# Patient Record
Sex: MALE | Race: WHITE | Employment: FULL TIME | URBAN - METROPOLITAN AREA
[De-identification: names, ages, dates, MRNs, and addresses within clinical notes are randomized per-mention and may not be internally consistent; named-entity substitution may affect disease eponyms.]

---

## 2017-02-16 PROBLEM — M85.80 OSTEOPENIA: Status: ACTIVE | Noted: 2017-02-16

## 2017-02-17 PROBLEM — S93.324A DISLOCATION OF TARSOMETATARSAL JOINT OF RIGHT FOOT: Status: ACTIVE | Noted: 2017-02-17

## 2017-10-03 PROBLEM — T84.9XXA COMPLICATION INVOLVING ORTHOPEDIC INTERNAL FIXATION DEVICE (HCC): Status: ACTIVE | Noted: 2017-10-03

## 2018-02-22 PROBLEM — Z98.890 S/P HARDWARE REMOVAL: Status: ACTIVE | Noted: 2018-02-22

## 2018-03-12 ENCOUNTER — HOSPITAL ENCOUNTER (OUTPATIENT)
Dept: PHYSICAL THERAPY | Age: 38
Setting detail: THERAPIES SERIES
Discharge: HOME | End: 2018-03-12
Payer: COMMERCIAL

## 2018-03-12 PROCEDURE — W0710 HC WORK COND PROG PER 15 MIN: HCPCS | Performed by: PHYSICAL THERAPIST

## 2018-03-12 NOTE — PROGRESS NOTES
[facility]                Phone: [phone]   Fax: [phone]    Physical Therapy Daily Treatment Note  Date:  3/12/2018    Patient Name:  Jalen Canas    :  1980  MRN: [id number]       Evaluating Physical Therapist:  KIRSTY Oseguera                (3/5/18)  Rerring Physician: Dylan Persaud   Diagnosis:    R ankle/ foot pain, gait dysfunction     (ORIF R foot)  Date initial Injury:  fall 10/6/16  Precautions: WBAT BLE  Insurance Information:  [employer]    Plan of care signed (Y/N):    Visit# / total visits:   3/24   Pain:    3/10     Time In:   933       Time Out:  1025           Subjective:     . Exercises:  Exercise/Equipment Resistance/Repetitions Other comments   Recumbent bike 10 min          leg press [id number]         toe raises 2x15    step ups  2x10x6\"            sides  2x10x6\"         stairwell 10x5         floor-waist lift 3 min at 20lb    waist-shoulder lift 3 min  at 20lb               standing work 5 min     push-pull cart 5 min at 50lb          ladder climbs           x2                 Other Therapeutic Activities:       Modalities:        Comments:        Home Exercise Program:      Comments:     Treatment/Activity Tolerance:  [] Patient tolerated treatment well [] Patient limited by fatigue  [] Patient limited by pain  [] Patient limited by other medical complications  [] Other:     Prognosis: [] Good [] Fair  [] Poor    Patient Requires Follow-up: [] Yes  [] No    Plan:   [] Continue per plan of care [] Alter current plan (see comments)  [] Plan of care initiated [] Hold pending MD visit [] Discharge    Plan for Next Session:        See Weekly Progress Note: []  Yes  []  No  Next due:            Electronically signed by:        Ovi Unger

## 2018-03-14 ENCOUNTER — HOSPITAL ENCOUNTER (OUTPATIENT)
Dept: PHYSICAL THERAPY | Age: 38
Setting detail: THERAPIES SERIES
Discharge: HOME | End: 2018-03-14
Payer: COMMERCIAL

## 2018-03-14 PROCEDURE — W0710 HC WORK COND PROG PER 15 MIN: HCPCS | Performed by: PHYSICAL THERAPIST

## 2018-03-16 ENCOUNTER — HOSPITAL ENCOUNTER (OUTPATIENT)
Dept: PHYSICAL THERAPY | Age: 38
Setting detail: THERAPIES SERIES
Discharge: HOME | End: 2018-03-16
Payer: COMMERCIAL

## 2018-03-16 PROCEDURE — W0710 HC WORK COND PROG PER 15 MIN: HCPCS | Performed by: PHYSICAL THERAPIST

## 2018-03-16 NOTE — PROGRESS NOTES
S:  pt presents to therapy for three of three scheduled visits this week for work [address]; at [facility] [employer] end he reports that his ankle seemed to ache with most of the activities he was given; no c/o buckling or LOB over last week's time; felt he needed extended rest at times due to fatigue and aching; pain level given as 3/10 on avg    O:  performed the exercises/tasks as written in the flowsheet for work reconditioning for the week ending 3/16/18; pt able to perform all functional tasks as written below at this time:    bike:  10 min   floor-waist lift:  3 min x 20lb  waist-shoulder lift:  3 min x 20lb  push-pull cart:  5 min x 50lb  standing work:  5 min     pt able to complete given circuit twice through in 2 hrs    A:  michael tx well; pt able to perform all requested tasks with good form and some extended rest periods; no c/o or signs of instability during or after session; static/dynamic balance was GOOD; endurance for all prolonged activities GOOD     P:  cont with POC of work reconditioning as set; look to increase both load and time by end of week.

## 2018-03-19 ENCOUNTER — HOSPITAL ENCOUNTER (OUTPATIENT)
Dept: PHYSICAL THERAPY | Age: 38
Setting detail: THERAPIES SERIES
Discharge: HOME | End: 2018-03-19
Payer: COMMERCIAL

## 2018-03-19 PROCEDURE — W0710 HC WORK COND PROG PER 15 MIN: HCPCS | Performed by: PHYSICAL THERAPIST

## 2018-03-19 NOTE — PROGRESS NOTES
[address] [facility]                Phone: [phone]   Fax: [phone]    Physical Therapy Daily Treatment Note  Date:  3/19/2018    Patient Name:  Abrahan Lam    :  1980  MRN: [id number]       Evaluating Physical Therapist:  KIRSTY Linares                (3/5/18)  Rerring Physician: Kiley Sam   Diagnosis:    R ankle/ foot pain, gait dysfunction     (ORIF R foot)  Date initial Injury:  fall 10/6/16  Precautions: WBAT BLE  Insurance Information:  [employer]    Plan of care signed (Y/N):    Visit# / total visits:      Pain:    3/10     Time In:   701       Time Out:  1026      Subjective:     . Exercises:  Exercise/Equipment Resistance/Repetitions Other comments   Recumbent bike 10 min          leg press [id number]         toe raises 2x15    step ups  2x10x6\"            sides  2x10x6\"         stairwell 10x5         floor-waist lift 3 min at 20lb    waist-shoulder lift 3 min  at 20lb               standing work 5 min     push-pull cart 5 min at 50lb          ladder climbs           x2                 Other Therapeutic Activities:       Modalities:        Comments:        Home Exercise Program:      Comments:     Treatment/Activity Tolerance:  [] Patient tolerated treatment well [] Patient limited by fatigue  [] Patient limited by pain  [] Patient limited by other medical complications  [] Other:     Prognosis: [] Good [] Fair  [] Poor    Patient Requires Follow-up: [] Yes  [] No    Plan:   [] Continue per plan of care [] Alter current plan (see comments)  [] Plan of care initiated [] Hold pending MD visit [] Discharge    Plan for Next Session:        See Weekly Progress Note: []  Yes  []  No  Next due:            Electronically signed by:        Triny Cannon

## 2018-03-21 ENCOUNTER — HOSPITAL ENCOUNTER (OUTPATIENT)
Dept: PHYSICAL THERAPY | Age: 38
Setting detail: THERAPIES SERIES
Discharge: HOME | End: 2018-03-21
Payer: COMMERCIAL

## 2018-03-21 NOTE — PROGRESS NOTES
[address] [facility]                Phone: [phone]  Fax: [phone]    Physical Therapy  Cancellation/No-show Note  Patient Name:  Maverick Platt  :  1980   Date:  3/21/2018    For today's appointment patient:  [x]  Cancelled  []  Rescheduled appointment  []  No-show     Reason given by patient:  []  Patient ill  []  Conflicting appointment  []  No transportation    []  Conflict with work  []  No reason given  [x]  Other:  son home from school. Comments:      Electronically signed by:   Kye Syed

## 2018-03-23 ENCOUNTER — HOSPITAL ENCOUNTER (OUTPATIENT)
Dept: PHYSICAL THERAPY | Age: 38
Setting detail: THERAPIES SERIES
Discharge: HOME | End: 2018-03-23
Payer: COMMERCIAL

## 2018-03-23 PROCEDURE — W0710 HC WORK COND PROG PER 15 MIN: HCPCS | Performed by: PHYSICAL THERAPIST

## 2018-03-23 NOTE — PROGRESS NOTES
[address] [facility]                Phone: [phone]   Fax: [phone]    Physical Therapy Daily Treatment Note  Date:  3/23/2018    Patient Name:  Erik Dukes    :  1980  MRN: [id number]       Evaluating Physical Therapist:  KIRSTY Gavin                (3/5/18)  Rerring Physician: [place] De Souza   Diagnosis:    R ankle/ foot pain, gait dysfunction     (ORIF R foot)  Date initial Injury:  fall 10/6/16  Precautions: WBAT BLE  Insurance Information:  [employer]    Plan of care signed (Y/N):    Visit# / total visits:      Pain:    3/10     Time In:   819       Time Out:  1018      Subjective:     . Exercises:  Exercise/Equipment Resistance/Repetitions Other comments   Recumbent bike 10 min          leg press [id number]         toe raises 2x15    step ups  2x10x6\"            sides  2x10x6\"         stairwell 10x5         floor-waist lift 3 min at 20lb    waist-shoulder lift 3 min  at 20lb               standing work 5 min     push-pull cart 5 min at 50lb          ladder climbs           x2                 Other Therapeutic Activities:       Modalities:        Comments:        Home Exercise Program:      Comments:     Treatment/Activity Tolerance:  [] Patient tolerated treatment well [] Patient limited by fatigue  [] Patient limited by pain  [] Patient limited by other medical complications  [] Other:     Prognosis: [] Good [] Fair  [] Poor    Patient Requires Follow-up: [] Yes  [] No    Plan:   [] Continue per plan of care [] Alter current plan (see comments)  [] Plan of care initiated [] Hold pending MD visit [] Discharge    Plan for Next Session:        See Weekly Progress Note: []  Yes  []  No  Next due:            Electronically signed by:        Geni Smith

## 2018-03-26 ENCOUNTER — HOSPITAL ENCOUNTER (OUTPATIENT)
Dept: PHYSICAL THERAPY | Age: 38
Setting detail: THERAPIES SERIES
Discharge: HOME | End: 2018-03-26
Payer: COMMERCIAL

## 2018-03-26 PROCEDURE — W0710 HC WORK COND PROG PER 15 MIN: HCPCS | Performed by: PHYSICAL THERAPIST

## 2018-03-26 NOTE — PROGRESS NOTES
[address] [facility]                Phone: [phone]   Fax: [phone]    Physical Therapy Daily Treatment Note  Date:  3/26/2018    Patient Name:  Javier Ramos    :  1980  MRN: [id number]       Evaluating Physical Therapist:  KIRSTY Saravia                (3/5/18)  Rerring Physician: Alfreda Adler   Diagnosis:    R ankle/ foot pain, gait dysfunction     (ORIF R foot)  Date initial Injury:  fall 10/6/16  Precautions: WBAT BLE  Insurance Information:  [employer]    Plan of care signed (Y/N):    Visit# / total visits:      Pain:    3/10     Time In:   439       Time Out:  1102      Subjective:     . Exercises:  Exercise/Equipment Resistance/Repetitions Other comments   Recumbent bike 10 min          leg press [id number]         toe raises 2x15    step ups  2x10x6\"            sides  2x10x6\"         stairwell 10x5         floor-waist lift 4 min at 20lb    waist-shoulder lift 4 min  at 20lb               standing work 5 min     push-pull cart 5 min at 50lb          ladder climbs           x2                 Other Therapeutic Activities:       Modalities:        Comments:        Home Exercise Program:      Comments:     Treatment/Activity Tolerance:  [] Patient tolerated treatment well [] Patient limited by fatigue  [] Patient limited by pain  [] Patient limited by other medical complications  [] Other:     Prognosis: [] Good [] Fair  [] Poor    Patient Requires Follow-up: [] Yes  [] No    Plan:   [] Continue per plan of care [] Alter current plan (see comments)  [] Plan of care initiated [] Hold pending MD visit [] Discharge    Plan for Next Session:        See Weekly Progress Note: []  Yes  []  No  Next due:            Electronically signed by:        Rylee Duron

## 2018-03-28 ENCOUNTER — HOSPITAL ENCOUNTER (OUTPATIENT)
Dept: PHYSICAL THERAPY | Age: 38
Setting detail: THERAPIES SERIES
Discharge: HOME | End: 2018-03-28
Payer: COMMERCIAL

## 2018-03-28 PROCEDURE — W0710 HC WORK COND PROG PER 15 MIN: HCPCS

## 2018-03-28 NOTE — PROGRESS NOTES
for Next Session:        See Weekly Progress Note: []  Yes  []  No  Next due:            Electronically signed by:     Tima Talbot DPT  [id number]

## 2018-04-04 ENCOUNTER — APPOINTMENT (OUTPATIENT)
Dept: PHYSICAL THERAPY | Age: 38
End: 2018-04-04
Payer: COMMERCIAL

## 2018-04-06 ENCOUNTER — HOSPITAL ENCOUNTER (OUTPATIENT)
Dept: PHYSICAL THERAPY | Age: 38
Setting detail: THERAPIES SERIES
Discharge: HOME | End: 2018-04-06
Payer: COMMERCIAL

## 2018-04-06 PROCEDURE — 97799 UNLISTED PHYSCL MED/REHAB PX: CPT

## 2018-04-09 ENCOUNTER — HOSPITAL ENCOUNTER (OUTPATIENT)
Dept: PHYSICAL THERAPY | Age: 38
Setting detail: THERAPIES SERIES
Discharge: HOME | End: 2018-04-09
Payer: COMMERCIAL

## 2018-04-09 PROCEDURE — W0710 HC WORK COND PROG PER 15 MIN: HCPCS | Performed by: PHYSICAL THERAPIST

## 2018-04-10 ENCOUNTER — TELEPHONE (OUTPATIENT)
Dept: ORTHOPEDIC SURGERY | Age: 38
End: 2018-04-10

## 2018-04-11 ENCOUNTER — HOSPITAL ENCOUNTER (OUTPATIENT)
Dept: PHYSICAL THERAPY | Age: 38
Setting detail: THERAPIES SERIES
Discharge: HOME | End: 2018-04-11
Payer: COMMERCIAL

## 2018-04-11 PROCEDURE — W0710 HC WORK COND PROG PER 15 MIN: HCPCS | Performed by: PHYSICAL THERAPIST

## 2018-04-13 ENCOUNTER — HOSPITAL ENCOUNTER (OUTPATIENT)
Dept: PHYSICAL THERAPY | Age: 38
Setting detail: THERAPIES SERIES
Discharge: HOME | End: 2018-04-13
Payer: COMMERCIAL

## 2018-04-13 PROCEDURE — W0710 HC WORK COND PROG PER 15 MIN: HCPCS | Performed by: PHYSICAL THERAPIST

## 2018-04-17 ENCOUNTER — HOSPITAL ENCOUNTER (OUTPATIENT)
Dept: PHYSICAL THERAPY | Age: 38
Setting detail: THERAPIES SERIES
Discharge: HOME | End: 2018-04-17
Payer: COMMERCIAL

## 2018-04-17 PROCEDURE — W0710 HC WORK COND PROG PER 15 MIN: HCPCS | Performed by: PHYSICAL THERAPIST

## 2018-04-18 ENCOUNTER — HOSPITAL ENCOUNTER (OUTPATIENT)
Dept: PHYSICAL THERAPY | Age: 38
Setting detail: THERAPIES SERIES
Discharge: HOME | End: 2018-04-18
Payer: COMMERCIAL

## 2018-04-18 PROCEDURE — W0710 HC WORK COND PROG PER 15 MIN: HCPCS | Performed by: PHYSICAL THERAPIST

## 2018-04-20 ENCOUNTER — HOSPITAL ENCOUNTER (OUTPATIENT)
Dept: PHYSICAL THERAPY | Age: 38
Setting detail: THERAPIES SERIES
Discharge: HOME | End: 2018-04-20
Payer: COMMERCIAL

## 2018-04-20 PROCEDURE — W0710 HC WORK COND PROG PER 15 MIN: HCPCS

## 2018-04-23 ENCOUNTER — HOSPITAL ENCOUNTER (OUTPATIENT)
Dept: PHYSICAL THERAPY | Age: 38
Setting detail: THERAPIES SERIES
Discharge: HOME | End: 2018-04-23
Payer: COMMERCIAL

## 2018-04-23 PROCEDURE — W0710 HC WORK COND PROG PER 15 MIN: HCPCS | Performed by: PHYSICAL THERAPIST

## 2018-04-25 ENCOUNTER — HOSPITAL ENCOUNTER (OUTPATIENT)
Dept: PHYSICAL THERAPY | Age: 38
Setting detail: THERAPIES SERIES
Discharge: HOME | End: 2018-04-25
Payer: COMMERCIAL

## 2018-04-25 PROCEDURE — W0710 HC WORK COND PROG PER 15 MIN: HCPCS

## 2018-05-08 ENCOUNTER — OFFICE VISIT (OUTPATIENT)
Dept: ORTHOPEDIC SURGERY | Age: 38
End: 2018-05-08
Payer: COMMERCIAL

## 2018-05-08 VITALS — BODY MASS INDEX: 22.96 KG/M2 | HEART RATE: 84 BPM | WEIGHT: 155 LBS | HEIGHT: 69 IN | OXYGEN SATURATION: 98 %

## 2018-05-08 DIAGNOSIS — T84.196D: ICD-10-CM

## 2018-05-08 DIAGNOSIS — S93.325D DISLOCATION OF TARSOMETATARSAL JOINT OF LEFT FOOT, SUBSEQUENT ENCOUNTER: Primary | ICD-10-CM

## 2018-05-08 DIAGNOSIS — S92.301D CLOSED DISPLACED FRACTURE OF METATARSAL BONE OF RIGHT FOOT WITH ROUTINE HEALING, UNSPECIFIED METATARSAL, SUBSEQUENT ENCOUNTER: ICD-10-CM

## 2018-05-08 DIAGNOSIS — S92.201D: ICD-10-CM

## 2018-05-08 DIAGNOSIS — S93.324D DISLOCATION OF TARSOMETATARSAL JOINT OF RIGHT FOOT, SUBSEQUENT ENCOUNTER: ICD-10-CM

## 2018-05-08 DIAGNOSIS — Z98.890 S/P HARDWARE REMOVAL: ICD-10-CM

## 2018-05-08 PROCEDURE — 99213 OFFICE O/P EST LOW 20 MIN: CPT | Performed by: ORTHOPAEDIC SURGERY

## 2018-05-08 PROCEDURE — 99212 OFFICE O/P EST SF 10 MIN: CPT

## 2018-05-16 ENCOUNTER — HOSPITAL ENCOUNTER (OUTPATIENT)
Dept: PHYSICAL THERAPY | Age: 38
Setting detail: THERAPIES SERIES
Discharge: HOME | End: 2018-05-16
Payer: COMMERCIAL

## 2018-05-16 NOTE — PROGRESS NOTES
[address] [facility]                Phone: [phone]   Fax: [phone]    To:  Dr. Lauren Barnhart       From: [place]      Patient: Edison Ramey       : 1980  Diagnosis:       Date: 2018  Treatment Diagnosis: R ankle/foot pain, gait dysfunction      Physical Therapy Discharge Note    Total Visits to date:   23 Cancels/No-shows to date:  5 cancellations/1 no-show     Plan of Care/Treatment to date:  [] Therapeutic Exercise    [] Modalities:  [] Therapeutic Activity     [] Ultrasound  [] Electrical Stimulation  [] Gait Training      [] Cervical Traction   [] Lumbar Traction  [] Neuromuscular Re-education  [] Cold/hotpack [] Iontophoresis  [] Instruction in HEP      Other:  [] Manual Therapy       [x]  work reconditioning  [] Aquatic Therapy       []                    ? Progress towards goals:  pt reached all stated functional LTG's for therapy and was I with HEP for home management of condition; FCE performed 18. Goal Status:  [x] Achieved [] Partially Achieved  [] Not Achieved     Patient Status: [] Continue per initial plan of Care     [x] Patient now discharged     [] Additional visits requested, Please re-certify for additional visits:          Electronically signed by: KIRSTY Byrne     If you have any questions or concerns, please don't hesitate to call.   Thank you for your referral.

## 2018-09-20 ENCOUNTER — TELEPHONE (OUTPATIENT)
Dept: ORTHOPEDIC SURGERY | Age: 38
End: 2018-09-20

## 2018-09-20 NOTE — TELEPHONE ENCOUNTER
Received vm from pt--ret his call--he stated he is having issues with his foot --he has had increase in swelling and pain and is concerned that something is wrong post surgery--he will call me back with his work schedule tomorrow so I can set him up to be seen

## 2018-09-28 ENCOUNTER — HOSPITAL ENCOUNTER (EMERGENCY)
Age: 38
Discharge: HOME | End: 2018-09-28

## 2018-09-28 ENCOUNTER — APPOINTMENT (OUTPATIENT)
Dept: GENERAL RADIOLOGY | Age: 38
End: 2018-09-28

## 2018-09-28 VITALS
BODY MASS INDEX: 22.96 KG/M2 | HEIGHT: 69 IN | SYSTOLIC BLOOD PRESSURE: 107 MMHG | RESPIRATION RATE: 14 BRPM | DIASTOLIC BLOOD PRESSURE: 65 MMHG | OXYGEN SATURATION: 97 % | HEART RATE: 88 BPM | TEMPERATURE: 99.6 F | WEIGHT: 155 LBS

## 2018-09-28 DIAGNOSIS — J06.9 UPPER RESPIRATORY TRACT INFECTION, UNSPECIFIED TYPE: Primary | ICD-10-CM

## 2018-09-28 DIAGNOSIS — J02.9 ACUTE PHARYNGITIS, UNSPECIFIED ETIOLOGY: ICD-10-CM

## 2018-09-28 DIAGNOSIS — R05.9 COUGH: ICD-10-CM

## 2018-09-28 LAB
INFLUENZA A BY PCR: NOT DETECTED
INFLUENZA B BY PCR: NOT DETECTED
STREP GRP A PCR: NEGATIVE

## 2018-09-28 PROCEDURE — 87502 INFLUENZA DNA AMP PROBE: CPT

## 2018-09-28 PROCEDURE — 87880 STREP A ASSAY W/OPTIC: CPT

## 2018-09-28 PROCEDURE — 71046 X-RAY EXAM CHEST 2 VIEWS: CPT

## 2018-09-28 PROCEDURE — 99283 EMERGENCY DEPT VISIT LOW MDM: CPT

## 2018-09-28 PROCEDURE — 6370000000 HC RX 637 (ALT 250 FOR IP): Performed by: NURSE PRACTITIONER

## 2018-09-28 RX ORDER — GUAIFENESIN 100 MG/5ML
200 SOLUTION ORAL ONCE
Status: COMPLETED | OUTPATIENT
Start: 2018-09-28 | End: 2018-09-28

## 2018-09-28 RX ORDER — BENZONATATE 100 MG/1
100 CAPSULE ORAL 3 TIMES DAILY PRN
Qty: 21 CAPSULE | Refills: 0 | Status: SHIPPED | OUTPATIENT
Start: 2018-09-28 | End: 2018-10-05

## 2018-09-28 RX ORDER — IBUPROFEN 800 MG/1
800 TABLET ORAL ONCE
Status: COMPLETED | OUTPATIENT
Start: 2018-09-28 | End: 2018-09-28

## 2018-09-28 RX ORDER — AMOXICILLIN 500 MG/1
500 CAPSULE ORAL 2 TIMES DAILY
Qty: 20 CAPSULE | Refills: 0 | Status: SHIPPED | OUTPATIENT
Start: 2018-09-28 | End: 2018-10-08

## 2018-09-28 RX ORDER — GUAIFENESIN 1200 MG/1
1 TABLET, EXTENDED RELEASE ORAL 2 TIMES DAILY PRN
Qty: 14 TABLET | Refills: 0 | Status: SHIPPED | OUTPATIENT
Start: 2018-09-28 | End: 2018-10-05

## 2018-09-28 RX ADMIN — GUAIFENESIN 200 MG: 200 SOLUTION ORAL at 17:47

## 2018-09-28 RX ADMIN — IBUPROFEN 800 MG: 800 TABLET, FILM COATED ORAL at 17:46

## 2018-09-28 ASSESSMENT — PAIN DESCRIPTION - PAIN TYPE: TYPE: ACUTE PAIN

## 2018-09-28 ASSESSMENT — PAIN SCALES - GENERAL
PAINLEVEL_OUTOF10: 5
PAINLEVEL_OUTOF10: 5

## 2018-09-28 ASSESSMENT — PAIN DESCRIPTION - LOCATION: LOCATION: GENERALIZED

## 2018-09-28 NOTE — ED PROVIDER NOTES
[facility]     Department of Emergency Medicine   ED  Provider Note  Admit Date/RoomTime: 9/28/2018  4:52 PM  ED Room: [facility]  Chief Complaint:   URI (symptoms started this morning); Cough (productive; clear phlegm); Pharyngitis; and Headache    History of Present Illness   Source of history provided by:  patient. History/Exam Limitations: none. Eudora Fothergill is a 43 y.o. old male with a past medical history of: History reviewed. No pertinent past medical history. presents to the emergency department by private vehicle, for fever, chills, nasal congestion, rhinorrhea, sore throat, facial pain and cough, which began 2 day(s) prior to arrival.  Since onset the symptoms have been persistent and moderate in severity. The symptoms are associated with nausea. There has been NO abdominal pain, appetite decrease, chest pain, diarrhea, dizziness, dysuria, vomiting or wheezing. He states that he is coughing up clear to white phlegm. Also states that he has been having intermittent headaches for the last couple days. ROS   Pertinent positives and negatives are stated within HPI, all other systems reviewed and are negative. Past Surgical History:  has a past surgical history that includes Ankle surgery (Right, 10/13/2016); other surgical history (Right, 11/17/2016); and Foot surgery (11/29/2017). Social History:  reports that he has been smoking Cigarettes. He has a 25.00 pack-year smoking history. He has never used smokeless tobacco. He reports that he drinks alcohol. He reports that he does not use drugs. Family History: family history is not on file. Allergies: Patient has no known allergies. Physical Exam           ED Triage Vitals [09/28/18 1649]   BP Temp Temp Source Pulse Resp SpO2 Height Weight   (!) 150/70 99.6 °F (37.6 °C) Oral 106 16 100 % 5' 9\" (1.753 m) 155 lb (70.3 kg)      Oxygen Saturation Interpretation: Normal.    · Constitutional:  Alert, development consistent with age.   · Ears: findings. Upper respiratory infection is likely to  be viral in etiology . Antibiotics are indicated at this time based on clinical presentation and physical findings. He is not hypoxic. Patient is well appearing, non toxic and appropriate for outpatient management. Plan of Care: Normal progression of disease discussed. All questions answered. Explained the rationale for symptomatic treatment rather than use of an antibiotic. Instruction provided in the use of fluids, vaporizer, acetaminophen, and other OTC medication for symptom control. Extra fluids  Analgesics as needed, dose reviewed. Follow up as needed should symptoms fail to improve. Counseling: The emergency provider has spoken with the patient and discussed todays results, in addition to providing specific details for the plan of care and counseling regarding the diagnosis and prognosis. Questions are answered at this time and they are agreeable with the plan. Assessment     1. Upper respiratory tract infection, unspecified type    2. Cough    3. Acute pharyngitis, unspecified etiology      Plan   Discharge to home  Patient condition is good    New Medications     New Prescriptions    AMOXICILLIN (AMOXIL) 500 MG CAPSULE    Take 1 capsule by mouth 2 times daily for 10 days    BENZONATATE (TESSALON PERLES) 100 MG CAPSULE    Take 1 capsule by mouth 3 times daily as needed for Cough    GUAIFENESIN (MUCINEX MAXIMUM STRENGTH) 1200 MG TB12    Take 1 tablet by mouth 2 times daily as needed (Congestion)     Electronically signed by HUMERA Lopez CNP   DD: 9/28/18  **This report was transcribed using voice recognition software. Every effort was made to ensure accuracy; however, inadvertent computerized transcription errors may be present.   END OF ED PROVIDER NOTE          HUMERA Iglesias CNP  09/28/18 1657

## 2018-12-19 ENCOUNTER — TELEPHONE (OUTPATIENT)
Dept: ADMINISTRATIVE | Age: 38
End: 2018-12-19

## 2018-12-19 NOTE — TELEPHONE ENCOUNTER
Pt wanted to schedule with Dr Bipin Laura in January. There were no openings. He was transferred to [facility].

## 2019-01-07 DIAGNOSIS — M79.671 RIGHT FOOT PAIN: Primary | ICD-10-CM

## 2019-01-10 ENCOUNTER — HOSPITAL ENCOUNTER (OUTPATIENT)
Dept: GENERAL RADIOLOGY | Age: 39
Discharge: HOME | End: 2019-01-12
Payer: COMMERCIAL

## 2019-01-10 ENCOUNTER — OFFICE VISIT (OUTPATIENT)
Dept: ORTHOPEDIC SURGERY | Age: 39
End: 2019-01-10
Payer: COMMERCIAL

## 2019-01-10 VITALS
SYSTOLIC BLOOD PRESSURE: 101 MMHG | BODY MASS INDEX: 22.96 KG/M2 | WEIGHT: 155 LBS | HEIGHT: 69 IN | DIASTOLIC BLOOD PRESSURE: 68 MMHG | RESPIRATION RATE: 16 BRPM | HEART RATE: 82 BPM

## 2019-01-10 DIAGNOSIS — M79.671 RIGHT FOOT PAIN: ICD-10-CM

## 2019-01-10 DIAGNOSIS — S93.324D DISLOCATION OF TARSOMETATARSAL JOINT OF RIGHT FOOT, SUBSEQUENT ENCOUNTER: Primary | ICD-10-CM

## 2019-01-10 DIAGNOSIS — Z98.890 S/P HARDWARE REMOVAL: ICD-10-CM

## 2019-01-10 PROCEDURE — 99212 OFFICE O/P EST SF 10 MIN: CPT

## 2019-01-10 PROCEDURE — 73630 X-RAY EXAM OF FOOT: CPT

## 2019-01-10 PROCEDURE — 99212 OFFICE O/P EST SF 10 MIN: CPT | Performed by: PHYSICIAN ASSISTANT

## 2019-03-07 ENCOUNTER — OFFICE VISIT (OUTPATIENT)
Dept: ORTHOPEDIC SURGERY | Age: 39
End: 2019-03-07
Payer: COMMERCIAL

## 2019-03-07 VITALS
BODY MASS INDEX: 25.18 KG/M2 | WEIGHT: 170 LBS | DIASTOLIC BLOOD PRESSURE: 69 MMHG | HEIGHT: 69 IN | SYSTOLIC BLOOD PRESSURE: 97 MMHG | RESPIRATION RATE: 17 BRPM | HEART RATE: 75 BPM

## 2019-03-07 DIAGNOSIS — Z98.890 S/P HARDWARE REMOVAL: ICD-10-CM

## 2019-03-07 DIAGNOSIS — S92.301D CLOSED DISPLACED FRACTURE OF METATARSAL BONE OF RIGHT FOOT WITH ROUTINE HEALING, UNSPECIFIED METATARSAL, SUBSEQUENT ENCOUNTER: ICD-10-CM

## 2019-03-07 DIAGNOSIS — M79.671 RIGHT FOOT PAIN: ICD-10-CM

## 2019-03-07 DIAGNOSIS — S93.324S DISLOCATION OF TARSOMETATARSAL JOINT OF RIGHT FOOT, SEQUELA: Primary | ICD-10-CM

## 2019-03-07 PROCEDURE — 99212 OFFICE O/P EST SF 10 MIN: CPT | Performed by: PHYSICIAN ASSISTANT

## 2020-03-08 ENCOUNTER — HOSPITAL ENCOUNTER (EMERGENCY)
Age: 40
Discharge: HOME | End: 2020-03-09
Attending: EMERGENCY MEDICINE
Payer: COMMERCIAL

## 2020-03-08 VITALS
OXYGEN SATURATION: 100 % | DIASTOLIC BLOOD PRESSURE: 77 MMHG | TEMPERATURE: 97.4 F | SYSTOLIC BLOOD PRESSURE: 134 MMHG | RESPIRATION RATE: 17 BRPM | HEART RATE: 96 BPM

## 2020-03-08 LAB
HCT VFR BLD CALC: 47.7 % (ref 37–54)
HEMOGLOBIN: 15.9 G/DL (ref 12.5–16.5)
MCH RBC QN AUTO: 31.4 PG (ref 26–35)
MCHC RBC AUTO-ENTMCNC: 33.3 % (ref 32–34.5)
MCV RBC AUTO: 94.3 FL (ref 80–99.9)
PDW BLD-RTO: 12.7 FL (ref 11.5–15)
PLATELET # BLD: 225 E9/L (ref 130–450)
PMV BLD AUTO: 9.5 FL (ref 7–12)
RBC # BLD: 5.06 E12/L (ref 3.8–5.8)
WBC # BLD: 7.8 E9/L (ref 4.5–11.5)

## 2020-03-08 PROCEDURE — 80053 COMPREHEN METABOLIC PANEL: CPT

## 2020-03-08 PROCEDURE — 96374 THER/PROPH/DIAG INJ IV PUSH: CPT

## 2020-03-08 PROCEDURE — 6360000002 HC RX W HCPCS: Performed by: EMERGENCY MEDICINE

## 2020-03-08 PROCEDURE — 96375 TX/PRO/DX INJ NEW DRUG ADDON: CPT

## 2020-03-08 PROCEDURE — 85027 COMPLETE CBC AUTOMATED: CPT

## 2020-03-08 PROCEDURE — 99284 EMERGENCY DEPT VISIT MOD MDM: CPT

## 2020-03-08 RX ORDER — KETOROLAC TROMETHAMINE 30 MG/ML
30 INJECTION, SOLUTION INTRAMUSCULAR; INTRAVENOUS ONCE
Status: DISCONTINUED | OUTPATIENT
Start: 2020-03-08 | End: 2020-03-08

## 2020-03-08 RX ORDER — METOCLOPRAMIDE 5 MG/1
10 TABLET ORAL ONCE
Status: DISCONTINUED | OUTPATIENT
Start: 2020-03-08 | End: 2020-03-08

## 2020-03-08 RX ORDER — DIPHENHYDRAMINE HYDROCHLORIDE 50 MG/ML
25 INJECTION INTRAMUSCULAR; INTRAVENOUS ONCE
Status: COMPLETED | OUTPATIENT
Start: 2020-03-08 | End: 2020-03-08

## 2020-03-08 RX ORDER — METOCLOPRAMIDE HYDROCHLORIDE 5 MG/ML
10 INJECTION INTRAMUSCULAR; INTRAVENOUS ONCE
Status: COMPLETED | OUTPATIENT
Start: 2020-03-08 | End: 2020-03-08

## 2020-03-08 RX ADMIN — DIPHENHYDRAMINE HYDROCHLORIDE 25 MG: 50 INJECTION, SOLUTION INTRAMUSCULAR; INTRAVENOUS at 23:40

## 2020-03-08 RX ADMIN — METOCLOPRAMIDE 10 MG: 5 INJECTION, SOLUTION INTRAMUSCULAR; INTRAVENOUS at 23:40

## 2020-03-08 ASSESSMENT — PAIN DESCRIPTION - LOCATION: LOCATION: HEAD

## 2020-03-08 ASSESSMENT — PAIN DESCRIPTION - DESCRIPTORS: DESCRIPTORS: HEADACHE

## 2020-03-08 ASSESSMENT — PAIN SCALES - GENERAL: PAINLEVEL_OUTOF10: 10

## 2020-03-09 ENCOUNTER — APPOINTMENT (OUTPATIENT)
Dept: CT IMAGING | Age: 40
End: 2020-03-09
Payer: COMMERCIAL

## 2020-03-09 LAB
ALBUMIN SERPL-MCNC: 4.4 G/DL (ref 3.5–5.2)
ALP BLD-CCNC: 89 U/L (ref 40–129)
ALT SERPL-CCNC: 29 U/L (ref 0–40)
ANION GAP SERPL CALCULATED.3IONS-SCNC: 12 MMOL/L (ref 7–16)
AST SERPL-CCNC: 20 U/L (ref 0–39)
BILIRUB SERPL-MCNC: 0.2 MG/DL (ref 0–1.2)
BUN BLDV-MCNC: 16 MG/DL (ref 6–20)
CALCIUM SERPL-MCNC: 9.1 MG/DL (ref 8.6–10.2)
CHLORIDE BLD-SCNC: 106 MMOL/L (ref 98–107)
CO2: 23 MMOL/L (ref 22–29)
CREAT SERPL-MCNC: 0.9 MG/DL (ref 0.7–1.2)
GFR AFRICAN AMERICAN: >60
GFR NON-AFRICAN AMERICAN: >60 ML/MIN/1.73
GLUCOSE BLD-MCNC: 136 MG/DL (ref 74–99)
POTASSIUM SERPL-SCNC: 3.9 MMOL/L (ref 3.5–5)
SODIUM BLD-SCNC: 141 MMOL/L (ref 132–146)
TOTAL PROTEIN: 6.6 G/DL (ref 6.4–8.3)

## 2020-03-09 PROCEDURE — 6360000002 HC RX W HCPCS: Performed by: EMERGENCY MEDICINE

## 2020-03-09 PROCEDURE — 72125 CT NECK SPINE W/O DYE: CPT

## 2020-03-09 PROCEDURE — 96375 TX/PRO/DX INJ NEW DRUG ADDON: CPT

## 2020-03-09 PROCEDURE — 70450 CT HEAD/BRAIN W/O DYE: CPT

## 2020-03-09 RX ORDER — KETOROLAC TROMETHAMINE 30 MG/ML
30 INJECTION, SOLUTION INTRAMUSCULAR; INTRAVENOUS ONCE
Status: COMPLETED | OUTPATIENT
Start: 2020-03-09 | End: 2020-03-09

## 2020-03-09 RX ADMIN — KETOROLAC TROMETHAMINE 30 MG: 30 INJECTION, SOLUTION INTRAMUSCULAR; INTRAVENOUS at 01:11

## 2020-03-09 NOTE — ED PROVIDER NOTES
Department of Emergency Medicine   ED  Provider Note  Admit Date/RoomTime: 3/8/2020 11:23 PM  ED Room: A/14A-14          History of Present Illness:  3/8/20, Time: 11:31 PM EDT         Mariangel Garcia is a 35 y.o. male presenting to the ED for headache, beginning 3 days ago. The complaint has been persistent, moderate in severity, and worsened by nothing. Pt states that he feels like he has a migraine. States that he also has left occipital/neck pain, which began 3 months ago. Has more pain upon twisting and turning his head certain ways. Reports that he has seen his PCP about the pain and was treated with medication. States that he has not had any relief with the medication. Pt recently had XRs but no MRI. States that he has been dealing with his neck pain and headache over the past few days. States that he had intermittent nausea. Denies family hx of aneurysms. Denies fall or injury. Denies chest pain, shortness of breath, fever, chills, abdominal pain, emesis, diarrhea, constipation, weakness, numbness, tingling, arm pain, and further complaints at this time. Review of Systems:   Pertinent positives and negatives are stated within HPI, all other systems reviewed and are negative.      --------------------------------------------- PAST HISTORY ---------------------------------------------  Past Medical History:  has no past medical history on file. Past Surgical History:  has a past surgical history that includes Ankle surgery (Right, 10/13/2016); other surgical history (Right, 11/17/2016); and Foot surgery (11/29/2017). Social History:  reports that he has been smoking cigarettes. He has a 12.50 pack-year smoking history. He has never used smokeless tobacco. He reports current alcohol use. He reports that he does not use drugs. Family History: family history is not on file. The patients home medications have been reviewed.     Allergies: Patient has no known

## 2020-03-09 NOTE — ED TRIAGE NOTES
FIRST PROVIDER CONTACT ASSESSMENT NOTE      Department of Emergency Medicine   Admit Date: 3/8/2020 11:23 PM    Chief Complaint: Headache (Pt states migraine for the past 3 days and lack of sleep. Pt being treated by PCP for neck pain. )      History of Present Illness:    Karl Cotto is a 35 y.o. male who presents to the ED for migraine x 3 days. Reports neck pain for which he has been following with his PCP for. Reports the pain starts in his neck and radiates around his head. Tried US, XR, and e-stim for neck. Does have headache history. Tried steroids, baclofen, flexeril, and soma without relief of neck pain.  Afebrile at home.         -----------------END OF FIRST PROVIDER CONTACT ASSESSMENT NOTE--------------  Electronically signed by NICK Gloria   DD: 3/8/20

## 2020-03-09 NOTE — ED NOTES
Bed: 14A-14  Expected date:   Expected time:   Means of arrival:   Comments:  triage     Jodi Roa RN  03/08/20 1972

## 2020-06-24 ENCOUNTER — TELEPHONE (OUTPATIENT)
Dept: ORTHOPEDIC SURGERY | Age: 40
End: 2020-06-24

## 2020-07-30 ENCOUNTER — OFFICE VISIT (OUTPATIENT)
Dept: ORTHOPEDIC SURGERY | Age: 40
End: 2020-07-30
Payer: COMMERCIAL

## 2020-07-30 ENCOUNTER — HOSPITAL ENCOUNTER (OUTPATIENT)
Dept: GENERAL RADIOLOGY | Age: 40
Discharge: HOME | End: 2020-08-01
Payer: COMMERCIAL

## 2020-07-30 VITALS
HEART RATE: 75 BPM | HEIGHT: 69 IN | DIASTOLIC BLOOD PRESSURE: 75 MMHG | WEIGHT: 175 LBS | SYSTOLIC BLOOD PRESSURE: 118 MMHG | BODY MASS INDEX: 25.92 KG/M2 | TEMPERATURE: 97.2 F

## 2020-07-30 PROCEDURE — 99212 OFFICE O/P EST SF 10 MIN: CPT

## 2020-07-30 PROCEDURE — 73630 X-RAY EXAM OF FOOT: CPT

## 2020-07-30 PROCEDURE — 99213 OFFICE O/P EST LOW 20 MIN: CPT | Performed by: PHYSICIAN ASSISTANT

## 2020-07-30 NOTE — PROGRESS NOTES
SURGEONAndie Lezama DO     DATE OF PROCEDURE:  11/29/2017  OPERATION PERFORMED:  Right midfoot deep hardware removal.    SURGEON:  BRYCE FAJARDO DO  DATE OF PROCEDURE:  11/17/2016  OPERATION:  1. Removal of right ankle and foot spanning external fixator  2. Right 2nd tarsometatarsal joint dislocation open reduction with internal fixation  3. Right tarsal joint dislocation involving inner cuneiform instability open reduction with internal fixation   4. Open treatment 3rd tarsometatarsal joint fracture dislocations    5. Closed treatment 4th tarsometatarsal joint fracture dislocations  6. Closed treatment 5th metatarsal fracture    Subjective:  Lady Mccann is approximately 3.5 years from initial ORIF and 2.5 years from hardware removal. This is a worker's comp case and he is here for yearly follow up. No new injuries or traumas. Daily pain is 1-3 on a scale to 10. Does still wear AFO for comfort while he is working. Full time work no restrictions currently. Still uses ice, elevation, and ibuprofen PRN which does relieve his pain. Mild intermittent numbness when he is ambulating/exercising too much, which does resolve spontaneously. Does still have intermittent LE edema which spontaneously resolves. Not wearing AFO today. Full WB b/l LE. No fevers, chills, new trauma.          Review of Systems -    General ROS: negative for - chills, fatigue, fever or night sweats  Respiratory ROS: no cough, shortness of breath, or wheezing  Cardiovascular ROS: no chest pain or dyspnea on exertion  Gastrointestinal ROS: no abdominal pain, nausea, vomiting, diarrhea, constipation,or black or bloody stools  Genitourinary: no hematuria, dysuria, or incontinence   Musculoskeletal ROS: negative for -back or neck pain or stiffness, also see HPI  Neurological ROS: no TIA or stroke symptoms       Objective:    General: Alert and oriented X 3, normocephalic atraumatic, external ears and eye normal, sclera clear, no acute distress, respirations easy and unlabored with no audible wheezes, skin warm and dry, speech and dress appropriate for noted age, affect euthymic. Extremity:  Right Lower Extremity  Skin clean dry and intact, without signs of infection  Incisions well healed  No edema noted  Nontender to palpation R foot  Compartments supple throughout thigh and leg  Calf supple and nontender  Demonstrates active knee flexion/extension, ankle plantar/dorsiflexion/great toe extension. States sensation intact to touch in sural/deep peroneal/superficial peroneal/saphenous/posterior tibial nerve distributions to foot/ankle. Palpable dorsalis pedis and posterior tibialis pulses, cap refill brisk in toes, foot warm/perfused. /75   Pulse 75   Temp 97.2 °F (36.2 °C)   Ht 5' 9\" (1.753 m)   Wt 175 lb (79.4 kg)   BMI 25.84 kg/m²     XR:   Views of R foot obtained today again demonstrate well healed fractures to metatarsals with no acute fractures or dislocations. Assessment:   Diagnosis Orders   1. Multiple closed fractures of tarsal bone of right foot with routine healing, subsequent encounter         Plan:   Reviewed x-rays with patient today in office     Continue AFO for comfort   Continue ice, elevation, OTC NSAIDS - recommend voltaren gel to avoid prolonged PO NSAID use   WBAT b/l LE   [employer] completed today     Follow up PRN or in 1 yr if need to complete [employer] paperwork in the future    Electronically signed by Jeniffer Mojica PA-C on 7/30/2020 at 2:56 PM  Note: This report was completed using [employer] voiced recognition software. Every effort has been made to ensure accuracy; however, inadvertent computerized transcription errors may be present.

## 2021-01-22 ENCOUNTER — TELEPHONE (OUTPATIENT)
Dept: ADMINISTRATIVE | Age: 41
End: 2021-01-22

## 2021-01-25 NOTE — TELEPHONE ENCOUNTER
Patient's last office visit: 07/30/20    He is not released to go back to work. Follow up in one year.     Future Appointments   Date Time Provider Joby Valdez   8/5/2021  2:45 PM Logan Berry DO [facility]

## 2021-01-25 NOTE — TELEPHONE ENCOUNTER
Ok so someone help me with this. He has a [employer] scanned in but it is not filled out,so is he to continue to be off if so he should of been back in the office by now. If he is a yearly follow up  then is he released to go back to work with out restrictions?

## 2021-01-27 NOTE — TELEPHONE ENCOUNTER
Sridevi Arzola been talking with the [facility] and paper work is not properly filled out. I will correct it and submit it.

## 2021-02-09 ENCOUNTER — TELEPHONE (OUTPATIENT)
Dept: ORTHOPEDIC SURGERY | Age: 41
End: 2021-02-09

## 2021-02-09 NOTE — TELEPHONE ENCOUNTER
Tried to call patient and discuss his [employer] 15. I asked is Jai Delarosa  there he  hesitated and said no. I stated that I was calling from the [facility] and I was calling about his workmen Comp. Im 98 percent sure that it was Jai Delarosa that answered the phone. I apologized for the call.

## 2021-08-02 ENCOUNTER — TELEPHONE (OUTPATIENT)
Dept: ORTHOPEDIC SURGERY | Age: 41
End: 2021-08-02

## 2021-08-02 DIAGNOSIS — S92.201D: Primary | ICD-10-CM

## 2021-08-05 ENCOUNTER — HOSPITAL ENCOUNTER (OUTPATIENT)
Dept: GENERAL RADIOLOGY | Age: 41
Discharge: HOME | End: 2021-08-07
Payer: COMMERCIAL

## 2021-08-05 ENCOUNTER — OFFICE VISIT (OUTPATIENT)
Dept: ORTHOPEDIC SURGERY | Age: 41
End: 2021-08-05
Payer: COMMERCIAL

## 2021-08-05 VITALS — TEMPERATURE: 98.4 F

## 2021-08-05 DIAGNOSIS — S92.201D: Primary | ICD-10-CM

## 2021-08-05 DIAGNOSIS — S92.201D: ICD-10-CM

## 2021-08-05 PROCEDURE — 99213 OFFICE O/P EST LOW 20 MIN: CPT | Performed by: PHYSICIAN ASSISTANT

## 2021-08-05 PROCEDURE — 99213 OFFICE O/P EST LOW 20 MIN: CPT | Performed by: ORTHOPAEDIC SURGERY

## 2021-08-05 PROCEDURE — 73630 X-RAY EXAM OF FOOT: CPT

## 2021-08-05 NOTE — PROGRESS NOTES
John Torres is a 42 y.o. male who presents for follow up of multiple fx right foot    SURGEON: Dr. Gisela Lindo DO  Date of Injury/Surgery: 10-6-16  Date last seen in office:  \"Last year\"    Symptoms: better  New complaints:  Persistent  mild to moderate pain with edema right anterior foot. Weightbearing:  Full weight bearing      Assistive device No Device  Participating in therapy (location if yes)?  no    Refills Needed: None  Order/Referral Needed: N/A

## 2021-08-05 NOTE — PROGRESS NOTES
Chief Complaint   Patient presents with    Pain      Multiple fx 5right foot     SURGEON: Diego Mccann DO     DATE OF PROCEDURE:  11/29/2017  OPERATION PERFORMED:  Right midfoot deep hardware removal.      SURGEON:  BRYCE FAJARDO DO  DATE OF PROCEDURE:  11/17/2016  OPERATION:  1. Removal of right ankle and foot spanning external fixator  2. Right 2nd tarsometatarsal joint dislocation open reduction with internal fixation  3. Right tarsal joint dislocation involving inner cuneiform instability open reduction with internal fixation   4. Open treatment 3rd tarsometatarsal joint fracture dislocations    5. Closed treatment 4th tarsometatarsal joint fracture dislocations  6. Closed treatment 5th metatarsal fracture    Subjective:  Matt Berry is following up from the above surgery. He is WBAT on right lower extremity. He ambulates with no assistive device. Pain to extremity is mild and is not taking prescribed pain medication. They denies numbness or tingling to the right lower extremity. Denies calf pain, chest pain, or shortness of breath. Patient has finished DVT prophylaxis. Patient is not participating in therapy at this time. Overall he is doing quite well. States that he has not been wearing the AFO as much because he feels he does not need it. 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 Complains of mild discomfort in the foot more so with prolonged standing or walking. Review of Systems -  All pertinent positives/negative in HPI     Objective:    General: Alert and oriented X 3, normocephalic atraumatic, external ears and eye normal, sclera clear, no acute distress, respirations easy and unlabored with no audible wheezes, skin warm and dry, speech and dress appropriate for noted age, affect euthymic.     Extremity:  Right Lower Extremity  Skin clean dry and intact, without signs of infection   Incision well healed  no edema noted  Compartments supple throughout thigh and leg  Calf supple and not tender  negative Homans  Demonstrates good active motion with plantar flexion. Mild limitation with dorsiflexion. Walks well in the office today without the use of any assistive devices. States sensation intact to touch in sural, deep peroneal, superficial peroneal, saphenous, posterior tibial  nerve distributions to foot/ankle. Palpable dorsalis pedis and posterior tibialis pulses, cap refill brisk in toes, foot warm/perfused. Temp 98.4 °F (36.9 °C) (Oral)     XR:   3 views right foot demonstrate no acute osseous abnormalities, continued appearance of osteopenia, no fracture or dislocation. Assessment:   Diagnosis Orders   1. Multiple closed fractures of tarsal bone of right foot with routine healing, subsequent encounter       Plan:  X-rays reviewed and discussed. Continue weightbearing as tolerated right lower extremity. At this point, he will continue working with restrictions consisting of no climbing ladders and taking periodic breaks. He will follow-up on an as-needed basis and was advised to contact the office if he has any problems. [facility] Patient Plan Of Care  New Orders Needing C-9 Authorization: None at this appointment  [employer]/Work Status: Released to light duty if available with restrictions of No climbing ladders, take periodic breaks. Patient Progressing: Progressing as expected  Patient candidate for Vocational Rehab at this time: No  Patient determined to be MMI at this visit: No    Electronically signed by NICK Colin on 8/5/2021 at 3:34 PM  Note: This report was completed using [employer] voiced recognition software. Every effort has been made to ensure accuracy; however, inadvertent computerized transcription errors may be present.

## 2021-12-03 ENCOUNTER — APPOINTMENT (OUTPATIENT)
Dept: GENERAL RADIOLOGY | Age: 41
End: 2021-12-03
Payer: COMMERCIAL

## 2021-12-03 ENCOUNTER — HOSPITAL ENCOUNTER (EMERGENCY)
Age: 41
Discharge: HOME | End: 2021-12-03
Attending: STUDENT IN AN ORGANIZED HEALTH CARE EDUCATION/TRAINING PROGRAM
Payer: COMMERCIAL

## 2021-12-03 VITALS
TEMPERATURE: 97.8 F | WEIGHT: 175 LBS | BODY MASS INDEX: 25.92 KG/M2 | SYSTOLIC BLOOD PRESSURE: 128 MMHG | OXYGEN SATURATION: 99 % | HEIGHT: 69 IN | DIASTOLIC BLOOD PRESSURE: 94 MMHG | RESPIRATION RATE: 18 BRPM | HEART RATE: 62 BPM

## 2021-12-03 DIAGNOSIS — R10.13 ABDOMINAL PAIN, EPIGASTRIC: Primary | ICD-10-CM

## 2021-12-03 LAB
ALBUMIN SERPL-MCNC: 4.3 G/DL (ref 3.5–5.2)
ALP BLD-CCNC: 107 U/L (ref 40–129)
ALT SERPL-CCNC: 23 U/L (ref 0–40)
ANION GAP SERPL CALCULATED.3IONS-SCNC: 10 MMOL/L (ref 7–16)
AST SERPL-CCNC: 16 U/L (ref 0–39)
BASOPHILS ABSOLUTE: 0.04 E9/L (ref 0–0.2)
BASOPHILS RELATIVE PERCENT: 0.6 % (ref 0–2)
BILIRUB SERPL-MCNC: 0.7 MG/DL (ref 0–1.2)
BILIRUBIN DIRECT: <0.2 MG/DL (ref 0–0.3)
BILIRUBIN, INDIRECT: NORMAL MG/DL (ref 0–1)
BUN BLDV-MCNC: 10 MG/DL (ref 6–20)
CALCIUM SERPL-MCNC: 9.1 MG/DL (ref 8.6–10.2)
CHLORIDE BLD-SCNC: 103 MMOL/L (ref 98–107)
CO2: 26 MMOL/L (ref 22–29)
CREAT SERPL-MCNC: 0.9 MG/DL (ref 0.7–1.2)
EKG ATRIAL RATE: 67 BPM
EKG P AXIS: 16 DEGREES
EKG P-R INTERVAL: 132 MS
EKG Q-T INTERVAL: 412 MS
EKG QRS DURATION: 86 MS
EKG QTC CALCULATION (BAZETT): 435 MS
EKG R AXIS: 64 DEGREES
EKG T AXIS: 27 DEGREES
EKG VENTRICULAR RATE: 67 BPM
EOSINOPHILS ABSOLUTE: 0.21 E9/L (ref 0.05–0.5)
EOSINOPHILS RELATIVE PERCENT: 3.3 % (ref 0–6)
GFR AFRICAN AMERICAN: >60
GFR NON-AFRICAN AMERICAN: >60 ML/MIN/1.73
GLUCOSE BLD-MCNC: 102 MG/DL (ref 74–99)
HCT VFR BLD CALC: 46 % (ref 37–54)
HEMOGLOBIN: 15.9 G/DL (ref 12.5–16.5)
IMMATURE GRANULOCYTES #: 0.01 E9/L
IMMATURE GRANULOCYTES %: 0.2 % (ref 0–5)
LACTIC ACID: 0.9 MMOL/L (ref 0.5–2.2)
LIPASE: 20 U/L (ref 13–60)
LYMPHOCYTES ABSOLUTE: 1.69 E9/L (ref 1.5–4)
LYMPHOCYTES RELATIVE PERCENT: 26.9 % (ref 20–42)
MCH RBC QN AUTO: 31.6 PG (ref 26–35)
MCHC RBC AUTO-ENTMCNC: 34.6 % (ref 32–34.5)
MCV RBC AUTO: 91.5 FL (ref 80–99.9)
MONOCYTES ABSOLUTE: 0.62 E9/L (ref 0.1–0.95)
MONOCYTES RELATIVE PERCENT: 9.9 % (ref 2–12)
NEUTROPHILS ABSOLUTE: 3.71 E9/L (ref 1.8–7.3)
NEUTROPHILS RELATIVE PERCENT: 59.1 % (ref 43–80)
PDW BLD-RTO: 12.8 FL (ref 11.5–15)
PLATELET # BLD: 230 E9/L (ref 130–450)
PMV BLD AUTO: 9.5 FL (ref 7–12)
POTASSIUM REFLEX MAGNESIUM: 4 MMOL/L (ref 3.5–5)
PRO-BNP: 17 PG/ML (ref 0–125)
RBC # BLD: 5.03 E12/L (ref 3.8–5.8)
SODIUM BLD-SCNC: 139 MMOL/L (ref 132–146)
TOTAL PROTEIN: 6.7 G/DL (ref 6.4–8.3)
TROPONIN, HIGH SENSITIVITY: <6 NG/L (ref 0–11)
WBC # BLD: 6.3 E9/L (ref 4.5–11.5)

## 2021-12-03 PROCEDURE — 85025 COMPLETE CBC W/AUTO DIFF WBC: CPT

## 2021-12-03 PROCEDURE — 84484 ASSAY OF TROPONIN QUANT: CPT

## 2021-12-03 PROCEDURE — 96375 TX/PRO/DX INJ NEW DRUG ADDON: CPT

## 2021-12-03 PROCEDURE — 93005 ELECTROCARDIOGRAM TRACING: CPT | Performed by: STUDENT IN AN ORGANIZED HEALTH CARE EDUCATION/TRAINING PROGRAM

## 2021-12-03 PROCEDURE — 80048 BASIC METABOLIC PNL TOTAL CA: CPT

## 2021-12-03 PROCEDURE — 6370000000 HC RX 637 (ALT 250 FOR IP): Performed by: STUDENT IN AN ORGANIZED HEALTH CARE EDUCATION/TRAINING PROGRAM

## 2021-12-03 PROCEDURE — 83690 ASSAY OF LIPASE: CPT

## 2021-12-03 PROCEDURE — 83605 ASSAY OF LACTIC ACID: CPT

## 2021-12-03 PROCEDURE — 2580000003 HC RX 258: Performed by: STUDENT IN AN ORGANIZED HEALTH CARE EDUCATION/TRAINING PROGRAM

## 2021-12-03 PROCEDURE — 99284 EMERGENCY DEPT VISIT MOD MDM: CPT

## 2021-12-03 PROCEDURE — 96374 THER/PROPH/DIAG INJ IV PUSH: CPT

## 2021-12-03 PROCEDURE — 71045 X-RAY EXAM CHEST 1 VIEW: CPT

## 2021-12-03 PROCEDURE — 2500000003 HC RX 250 WO HCPCS: Performed by: STUDENT IN AN ORGANIZED HEALTH CARE EDUCATION/TRAINING PROGRAM

## 2021-12-03 PROCEDURE — 83880 ASSAY OF NATRIURETIC PEPTIDE: CPT

## 2021-12-03 PROCEDURE — 93010 ELECTROCARDIOGRAM REPORT: CPT | Performed by: INTERNAL MEDICINE

## 2021-12-03 PROCEDURE — 6360000002 HC RX W HCPCS: Performed by: STUDENT IN AN ORGANIZED HEALTH CARE EDUCATION/TRAINING PROGRAM

## 2021-12-03 PROCEDURE — 80076 HEPATIC FUNCTION PANEL: CPT

## 2021-12-03 RX ORDER — 0.9 % SODIUM CHLORIDE 0.9 %
1000 INTRAVENOUS SOLUTION INTRAVENOUS ONCE
Status: COMPLETED | OUTPATIENT
Start: 2021-12-03 | End: 2021-12-03

## 2021-12-03 RX ORDER — KETOROLAC TROMETHAMINE 30 MG/ML
15 INJECTION, SOLUTION INTRAMUSCULAR; INTRAVENOUS ONCE
Status: COMPLETED | OUTPATIENT
Start: 2021-12-03 | End: 2021-12-03

## 2021-12-03 RX ORDER — FAMOTIDINE 20 MG/1
20 TABLET, FILM COATED ORAL DAILY
Qty: 60 TABLET | Refills: 0 | Status: SHIPPED | OUTPATIENT
Start: 2021-12-03

## 2021-12-03 RX ORDER — ONDANSETRON 2 MG/ML
4 INJECTION INTRAMUSCULAR; INTRAVENOUS ONCE
Status: COMPLETED | OUTPATIENT
Start: 2021-12-03 | End: 2021-12-03

## 2021-12-03 RX ORDER — ONDANSETRON 4 MG/1
4 TABLET, ORALLY DISINTEGRATING ORAL 3 TIMES DAILY PRN
Qty: 21 TABLET | Refills: 0 | Status: SHIPPED | OUTPATIENT
Start: 2021-12-03

## 2021-12-03 RX ADMIN — KETOROLAC TROMETHAMINE 15 MG: 30 INJECTION, SOLUTION INTRAMUSCULAR; INTRAVENOUS at 09:02

## 2021-12-03 RX ADMIN — ONDANSETRON 4 MG: 2 INJECTION INTRAMUSCULAR; INTRAVENOUS at 07:54

## 2021-12-03 RX ADMIN — FAMOTIDINE 20 MG: 10 INJECTION INTRAVENOUS at 07:53

## 2021-12-03 RX ADMIN — LIDOCAINE HYDROCHLORIDE: 20 SOLUTION ORAL; TOPICAL at 08:37

## 2021-12-03 RX ADMIN — SODIUM CHLORIDE 1000 ML: 9 INJECTION, SOLUTION INTRAVENOUS at 07:54

## 2021-12-03 ASSESSMENT — PAIN SCALES - GENERAL
PAINLEVEL_OUTOF10: 8
PAINLEVEL_OUTOF10: 6

## 2021-12-03 ASSESSMENT — ENCOUNTER SYMPTOMS
EYE REDNESS: 0
SORE THROAT: 0
ABDOMINAL PAIN: 1
COUGH: 0
SINUS PRESSURE: 0
VOMITING: 1
EYE PAIN: 0
SHORTNESS OF BREATH: 0
WHEEZING: 0
EYE DISCHARGE: 0
BACK PAIN: 0
NAUSEA: 1
DIARRHEA: 1

## 2021-12-03 ASSESSMENT — PAIN DESCRIPTION - PAIN TYPE: TYPE: ACUTE PAIN

## 2021-12-03 ASSESSMENT — PAIN DESCRIPTION - ORIENTATION: ORIENTATION: MID

## 2021-12-03 ASSESSMENT — PAIN DESCRIPTION - FREQUENCY: FREQUENCY: CONTINUOUS

## 2021-12-03 ASSESSMENT — PAIN DESCRIPTION - LOCATION: LOCATION: ABDOMEN

## 2021-12-03 ASSESSMENT — PAIN DESCRIPTION - DESCRIPTORS: DESCRIPTORS: SHARP;STABBING;CRAMPING

## 2022-01-18 ENCOUNTER — TELEPHONE (OUTPATIENT)
Dept: ORTHOPEDIC SURGERY | Age: 42
End: 2022-01-18

## 2022-01-18 NOTE — TELEPHONE ENCOUNTER
Received call from patient requesting a new [employer]-14, as his current one runs out on 2/9/2022. Patient inquiring if he needs another appointment for the [employer]-14 to be filled out and submitted. Routed to providers for recommendations.

## 2022-01-19 NOTE — TELEPHONE ENCOUNTER
He should probably come in for reevaluation since he has not been seen since August.  Need to determine if he can progress to full duty.

## 2022-01-19 NOTE — TELEPHONE ENCOUNTER
Call placed to patient at this time. Explained that providers recommend an appointment to determine further work restritcions and a new [employer]14. Appointment scheduled; patient agreeable to appointment date and time.     Future Appointments   Date Time Provider Joby Valdez   2/10/2022  2:30 PM DO Brittany Montenegro [facility]

## 2022-02-03 ENCOUNTER — TELEPHONE (OUTPATIENT)
Dept: ORTHOPEDIC SURGERY | Age: 42
End: 2022-02-03

## 2022-02-03 DIAGNOSIS — S92.201D: Primary | ICD-10-CM

## 2022-02-10 ENCOUNTER — HOSPITAL ENCOUNTER (OUTPATIENT)
Dept: GENERAL RADIOLOGY | Age: 42
Discharge: HOME | End: 2022-02-12
Payer: COMMERCIAL

## 2022-02-10 ENCOUNTER — OFFICE VISIT (OUTPATIENT)
Dept: ORTHOPEDIC SURGERY | Age: 42
End: 2022-02-10
Payer: COMMERCIAL

## 2022-02-10 DIAGNOSIS — S92.201D: ICD-10-CM

## 2022-02-10 DIAGNOSIS — S92.201D: Primary | ICD-10-CM

## 2022-02-10 PROCEDURE — 99213 OFFICE O/P EST LOW 20 MIN: CPT | Performed by: PHYSICIAN ASSISTANT

## 2022-02-10 PROCEDURE — 99212 OFFICE O/P EST SF 10 MIN: CPT | Performed by: ORTHOPAEDIC SURGERY

## 2022-02-10 PROCEDURE — 73630 X-RAY EXAM OF FOOT: CPT

## 2022-02-10 RX ORDER — LORAZEPAM 0.5 MG/1
TABLET ORAL
COMMUNITY
Start: 2021-12-15

## 2022-02-10 RX ORDER — ZOLPIDEM TARTRATE 10 MG/1
TABLET ORAL
COMMUNITY
Start: 2022-01-28

## 2022-02-10 RX ORDER — CITALOPRAM 20 MG/1
TABLET ORAL
COMMUNITY
Start: 2022-02-04

## 2022-02-10 NOTE — PROGRESS NOTES
Chief Complaint   Patient presents with    Foot Pain     right of [facility] 11/29/2017, right foot ORIF 11/17/2016      SURGEON: Dwaine Aguiar DO     DATE OF PROCEDURE:  11/29/2017  OPERATION PERFORMED:  Right midfoot deep hardware removal.      SURGEON:  BRYCE FAJARDO DO  DATE OF PROCEDURE:  11/17/2016  OPERATION:  1. Removal of right ankle and foot spanning external fixator  2. Right 2nd tarsometatarsal joint dislocation open reduction with internal fixation  3. Right tarsal joint dislocation involving inner cuneiform instability open reduction with internal fixation   4. Open treatment 3rd tarsometatarsal joint fracture dislocations    5. Closed treatment 4th tarsometatarsal joint fracture dislocations  6. Closed treatment 5th metatarsal fracture     Subjective:  Lorita Saint is following up from the above surgery. He is WBAT on right lower extremity. He ambulates with no assistive device. Pain to extremity is mild and is not taking prescribed pain medication. They denies numbness or tingling to the right lower extremity. Denies calf pain, chest pain, or shortness of breath. Patient has finished DVT prophylaxis. Patient is not participating in therapy at this time. Overall he is doing well. He does have some occasional soreness in his foot after standing or walking for extended periods. 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 Review of Systems -  All pertinent positives/negative in HPI     Objective:    General: Alert and oriented X 3, normocephalic atraumatic, external ears and eye normal, sclera clear, no acute distress, respirations easy and unlabored with no audible wheezes, skin warm and dry, speech and dress appropriate for noted age, affect euthymic.     Extremity:  Right Lower Extremity  Skin clean dry and intact, without signs of infection   Incision well healed  no edema noted  Compartments supple throughout thigh and leg  Calf supple and not tender  negative Homans  Demonstrates active motion with ankle dorsi/plantar flexion. Wiggles all of his toes. Ambulates with no assistive devices. States sensation intact to touch in sural, deep peroneal, superficial peroneal, saphenous, posterior tibial  nerve distributions to foot/ankle. Palpable dorsalis pedis and posterior tibialis pulses, cap refill brisk in toes, foot warm/perfused. There were no vitals taken for this visit. XR:   3 views right foot demonstrate no acute osseous abnormalities, continued appearance of osteopenia, no fracture or dislocation. Assessment:   Diagnosis Orders   1. Multiple closed fractures of tarsal bone of right foot with routine healing, subsequent encounter       Plan:  X-rays reviewed and discussed. Continue weightbearing as tolerated right lower extremity. Follow-up in 6 months for reevaluation and x-rays. Call if any questions or concerns. [facility] Patient Plan Of Care  New Orders Needing C-9 Authorization: None at this appointment  [employer]/Work Status: Continue full duty work with no restrictions. Patient Progressing: Progressing as expected  Patient candidate for Vocational Rehab at this time: No  Patient determined to be MMI at this visit: No    Electronically signed by NICK Laughlin on 2/10/2022 at 3:27 PM  Note: This report was completed using [employer] voiced recognition software. Every effort has been made to ensure accuracy; however, inadvertent computerized transcription errors may be present.

## 2022-06-14 ENCOUNTER — APPOINTMENT (OUTPATIENT)
Dept: CT IMAGING | Age: 42
End: 2022-06-14
Payer: COMMERCIAL

## 2022-06-14 ENCOUNTER — HOSPITAL ENCOUNTER (EMERGENCY)
Age: 42
Discharge: HOME | End: 2022-06-14
Payer: COMMERCIAL

## 2022-06-14 ENCOUNTER — APPOINTMENT (OUTPATIENT)
Dept: GENERAL RADIOLOGY | Age: 42
End: 2022-06-14
Payer: COMMERCIAL

## 2022-06-14 VITALS
WEIGHT: 175 LBS | RESPIRATION RATE: 16 BRPM | TEMPERATURE: 97.3 F | DIASTOLIC BLOOD PRESSURE: 72 MMHG | OXYGEN SATURATION: 100 % | HEIGHT: 69 IN | BODY MASS INDEX: 25.92 KG/M2 | SYSTOLIC BLOOD PRESSURE: 126 MMHG | HEART RATE: 86 BPM

## 2022-06-14 DIAGNOSIS — K76.89 LIVER CYST: ICD-10-CM

## 2022-06-14 DIAGNOSIS — M54.50 ACUTE BILATERAL LOW BACK PAIN, UNSPECIFIED WHETHER SCIATICA PRESENT: Primary | ICD-10-CM

## 2022-06-14 LAB
BACTERIA: ABNORMAL /HPF
BILIRUBIN URINE: NEGATIVE
BLOOD, URINE: NEGATIVE
CELLULAR CASTS: ABNORMAL /LPF
CLARITY: NORMAL
COLOR: YELLOW
FINE CASTS, UA: ABNORMAL /LPF (ref 0–2)
GLUCOSE URINE: NEGATIVE MG/DL
KETONES, URINE: NEGATIVE MG/DL
LEUKOCYTE ESTERASE, URINE: NEGATIVE
MUCUS: PRESENT /LPF
NITRITE, URINE: NEGATIVE
PH UA: 6 (ref 5–9)
PROTEIN UA: NEGATIVE MG/DL
RBC UA: ABNORMAL /HPF (ref 0–2)
SPECIFIC GRAVITY UA: 1.02 (ref 1–1.03)
UROBILINOGEN, URINE: 0.2 E.U./DL
WBC UA: ABNORMAL /HPF (ref 0–5)

## 2022-06-14 PROCEDURE — 6360000002 HC RX W HCPCS: Performed by: PHYSICIAN ASSISTANT

## 2022-06-14 PROCEDURE — 72131 CT LUMBAR SPINE W/O DYE: CPT

## 2022-06-14 PROCEDURE — 81001 URINALYSIS AUTO W/SCOPE: CPT

## 2022-06-14 PROCEDURE — 74176 CT ABD & PELVIS W/O CONTRAST: CPT

## 2022-06-14 PROCEDURE — 96372 THER/PROPH/DIAG INJ SC/IM: CPT

## 2022-06-14 PROCEDURE — 99284 EMERGENCY DEPT VISIT MOD MDM: CPT

## 2022-06-14 PROCEDURE — 72100 X-RAY EXAM L-S SPINE 2/3 VWS: CPT

## 2022-06-14 RX ORDER — MORPHINE SULFATE 4 MG/ML
6 INJECTION, SOLUTION INTRAMUSCULAR; INTRAVENOUS ONCE
Status: COMPLETED | OUTPATIENT
Start: 2022-06-14 | End: 2022-06-14

## 2022-06-14 RX ORDER — OXYCODONE HYDROCHLORIDE AND ACETAMINOPHEN 5; 325 MG/1; MG/1
1 TABLET ORAL EVERY 6 HOURS PRN
Qty: 12 TABLET | Refills: 0 | Status: SHIPPED | OUTPATIENT
Start: 2022-06-14 | End: 2022-06-17

## 2022-06-14 RX ORDER — ORPHENADRINE CITRATE 30 MG/ML
60 INJECTION INTRAMUSCULAR; INTRAVENOUS ONCE
Status: COMPLETED | OUTPATIENT
Start: 2022-06-14 | End: 2022-06-14

## 2022-06-14 RX ORDER — CYCLOBENZAPRINE HCL 10 MG
10 TABLET ORAL 3 TIMES DAILY PRN
Qty: 21 TABLET | Refills: 0 | Status: SHIPPED | OUTPATIENT
Start: 2022-06-14 | End: 2022-06-24

## 2022-06-14 RX ADMIN — MORPHINE SULFATE 6 MG: 4 INJECTION, SOLUTION INTRAMUSCULAR; INTRAVENOUS at 12:49

## 2022-06-14 RX ADMIN — HYDROMORPHONE HYDROCHLORIDE 1 MG: 1 INJECTION, SOLUTION INTRAMUSCULAR; INTRAVENOUS; SUBCUTANEOUS at 13:50

## 2022-06-14 RX ADMIN — ORPHENADRINE CITRATE 60 MG: 30 INJECTION INTRAMUSCULAR; INTRAVENOUS at 12:49

## 2022-06-14 ASSESSMENT — PAIN DESCRIPTION - LOCATION
LOCATION: BACK

## 2022-06-14 ASSESSMENT — PAIN - FUNCTIONAL ASSESSMENT: PAIN_FUNCTIONAL_ASSESSMENT: 0-10

## 2022-06-14 ASSESSMENT — PAIN SCALES - GENERAL
PAINLEVEL_OUTOF10: 10

## 2022-06-14 NOTE — ED PROVIDER NOTES
[facility]                                                                                                                                      Department of Emergency Medicine   ED  Provider Note  Admit Date/RoomTime: 6/14/2022 10:45 AM  ED Room: 31/31        HPI:  6/14/22,   Time: 10:58 AM EDT      Andie Roberto is a 36 y.o. male who presents to the ED for back pain, radiates to testicle. States this occurred when going to shut a door about a week ago. Reports there was no fall or direct trauma. The patient states the pain is diffuse across lumbar area, slightly worse on left. Pain is constant with intermittent sharp stabbing pains at times that radiate to testicles. Was seen at Urgent Care over the weekend. States he was given a few shots for pain and sent home with steroids. Reports he did get some relief for a few days but now the pain is worse again. Denies fever, chills or vomiting. There is no flank pain or hematuria. Has not called his PCP. No prior history of back pain or surgery. Denies loss of B/B control. No saddle numbness or paresthesia. ROS:     Constitutional: Negative for fever and chills  HENT: Negative for ear pain, sore throat and sinus pressure  Eyes: Negative for pain, discharge and redness  Respiratory:  Negative for shortness of breath, cough and wheezing  Cardiovascular: Negative for CP, edema or palpitations  Gastrointestinal: Negative for nausea, vomiting, diarrhea and abdominal distention  Genitourinary: Negative for dysuria and frequency  Musculoskeletal:  See HPI  Skin: Negative for rash and wound  Neurological: Negative for weakness and headaches  Hematological: Negative for adenopathy    All other systems reviewed and are negative      -------------------------------- PAST HISTORY ----------------------------------  Past Medical History:  has no past medical history on file.     Past Surgical History:  has a past surgical history that includes Ankle surgery (Right, 10/13/2016); other surgical history (Right, 11/17/2016); and Foot surgery (11/29/2017). Social History:  reports that he has been smoking cigarettes. He has a 25.00 pack-year smoking history. He has never used smokeless tobacco. He reports current alcohol use. He reports current drug use. Drug: Marijuana Myrtis Regulus). Family History: family history is not on file. The patients home medications have been reviewed. Allergies: Patient has no known allergies. --------------------------------- RESULTS ------------------------------------------  All laboratory and radiology results have been personally reviewed by myself   LABS:  Results for orders placed or performed during the hospital encounter of 06/14/22   Urinalysis   Result Value Ref Range    Color, UA Yellow Straw/Yellow    Clarity, UA SL CLOUDY Clear    Glucose, Ur Negative Negative mg/dL    Bilirubin Urine Negative Negative    Ketones, Urine Negative Negative mg/dL    Specific Gravity, UA 1.025 1.005 - 1.030    Blood, Urine Negative Negative    pH, UA 6.0 5.0 - 9.0    Protein, UA Negative Negative mg/dL    Urobilinogen, Urine 0.2 <2.0 E.U./dL    Nitrite, Urine Negative Negative    Leukocyte Esterase, Urine Negative Negative   Microscopic Urinalysis   Result Value Ref Range    Fine Casts, UA 0-2 0 - 2 /LPF    Cellular Cast, UA 1-3 (A) None Seen /LPF    Mucus, UA Present (A) None Seen /LPF    WBC, UA 1-3 0 - 5 /HPF    RBC, UA NONE 0 - 2 /HPF    Bacteria, UA RARE (A) None Seen /HPF       RADIOLOGY:  Interpreted by Radiologist.  Alberto Padron   Final Result   Degenerative disc disease most prominent at L4-L5 and L5-S1. CT ABDOMEN PELVIS WO CONTRAST Additional Contrast? None   Final Result   Small bilateral fat containing inguinal hernia. Hypoattenuating lesions in the liver measures approximately 1 cm are   indeterminate. Further evaluation may be obtained with hepatic ultrasound.          XR LUMBAR SPINE (2-3 VIEWS)   Final Result   No fracture. Mild retrolisthesis at L5 over S1. Mild osteo-degenerative   changes, as described above. Discogenic disc disease at L5-S1.             ----------------- NURSING NOTES AND VITALS REVIEWED ---------------   The nursing notes within the ED encounter and vital signs as below have been reviewed. /72   Pulse 86   Temp 97.3 °F (36.3 °C) (Temporal)   Resp 16   Ht 5' 9\" (1.753 m)   Wt 175 lb (79.4 kg)   SpO2 100%   BMI 25.84 kg/m²   Oxygen Saturation Interpretation: Normal      --------------------------------PHYSICAL EXAM------------------------------------      Constitutional/General: Alert and oriented x3, well appearing, non toxic in NAD  Head: NC/AT  Eyes: PERRL, EOMI  Mouth: Oropharynx clear, handling secretions, no trismus  Neck: Supple, full ROM, no meningeal signs  Pulmonary: Lungs clear to auscultation bilaterally, no wheezes, rales, or rhonchi. Not in respiratory distress  Cardiovascular:  Regular rate and rhythm, no murmurs, gallops, or rubs. 2+ distal pulses  Abdomen: Soft, + BS. No distension. Nontender. No palpable rigidity, rebound or guarding  Extremities: Moves all extremities x 4. Warm and well perfused  Skin: warm and dry without rash  Neurologic: GCS 15,  Intact. No focal deficits  Psych: Normal Affect      ------------------------ ED COURSE/MEDICAL DECISION MAKING----------------------  Medications   morphine sulfate (PF) injection 6 mg (6 mg IntraMUSCular Given 6/14/22 1249)   orphenadrine (NORFLEX) injection 60 mg (60 mg IntraMUSCular Given 6/14/22 1249)   HYDROmorphone (DILAUDID) injection 1 mg (1 mg IntraMUSCular Given 6/14/22 1350)         Medical Decision Making:    The patient was initially given shots of morphine and a muscle relaxer. He reported minimal improvement in pain. Degenerative changes were seen on the plain lumbar spine x-ray. The patient continued to complain of marked pain was given a dose of Dilaudid. CAT scan showed significant degenerative changes at several different levels. There were nonspecific cysts seen in the liver that were too small to characterize. The radiologist did suggest ultrasound for further characterization  Patient did get some relief of his pain. The patient was advised to follow-up with his PCP for further referral or assistance. Certainly if his symptoms persist he may need an MRI. He states that he plans to call the office in the a.m. The patient already has a prescription for steroids. Given just a few Percocet to help with his pain as well as a muscle relaxer. He can follow-up with PCP advised       Counseling: The emergency provider has spoken with the patient and discussed todays results, in addition to providing specific details for the plan of care and counseling regarding the diagnosis and prognosis. Questions are answered at this time and they are agreeable with the plan.      ------------------------ IMPRESSION AND DISPOSITION -------------------------------    IMPRESSION  1. Acute bilateral low back pain, unspecified whether sciatica present    2.  Liver cyst        DISPOSITION  Disposition: Discharge to home  Patient condition is stable                   Kendall Hartman PA-C  06/14/22 7507

## 2022-08-10 DIAGNOSIS — S93.325S DISLOCATION OF TARSOMETATARSAL JOINT OF LEFT FOOT, SEQUELA: Primary | ICD-10-CM

## 2022-08-11 ENCOUNTER — OFFICE VISIT (OUTPATIENT)
Dept: ORTHOPEDIC SURGERY | Age: 42
End: 2022-08-11
Payer: COMMERCIAL

## 2022-08-11 ENCOUNTER — HOSPITAL ENCOUNTER (OUTPATIENT)
Dept: GENERAL RADIOLOGY | Age: 42
Discharge: HOME | End: 2022-08-13
Payer: COMMERCIAL

## 2022-08-11 DIAGNOSIS — S92.201D: ICD-10-CM

## 2022-08-11 DIAGNOSIS — Z98.890 S/P HARDWARE REMOVAL: ICD-10-CM

## 2022-08-11 DIAGNOSIS — S92.301D CLOSED DISPLACED FRACTURE OF METATARSAL BONE OF RIGHT FOOT WITH ROUTINE HEALING, UNSPECIFIED METATARSAL, SUBSEQUENT ENCOUNTER: Primary | ICD-10-CM

## 2022-08-11 DIAGNOSIS — S93.325D DISLOCATION OF TARSOMETATARSAL JOINT OF LEFT FOOT, SUBSEQUENT ENCOUNTER: ICD-10-CM

## 2022-08-11 DIAGNOSIS — S93.324D DISLOCATION OF TARSOMETATARSAL JOINT OF RIGHT FOOT, SUBSEQUENT ENCOUNTER: ICD-10-CM

## 2022-08-11 DIAGNOSIS — S93.325S DISLOCATION OF TARSOMETATARSAL JOINT OF LEFT FOOT, SEQUELA: ICD-10-CM

## 2022-08-11 PROCEDURE — 73630 X-RAY EXAM OF FOOT: CPT

## 2022-08-11 PROCEDURE — 99212 OFFICE O/P EST SF 10 MIN: CPT

## 2022-08-11 PROCEDURE — 99213 OFFICE O/P EST LOW 20 MIN: CPT | Performed by: ORTHOPAEDIC SURGERY

## 2022-08-11 RX ORDER — IBUPROFEN 800 MG/1
TABLET ORAL
COMMUNITY
Start: 2022-06-12

## 2022-08-11 NOTE — PROGRESS NOTES
Chief Complaint   Patient presents with    Follow-up      right foot; JVG       SURGEON:  Martin Castaneda DO     DATE OF PROCEDURE:  11/29/2017  OPERATION PERFORMED:  Right midfoot deep hardware removal.      SURGEON:  Scott Corey DO  DATE OF PROCEDURE:  11/17/2016  OPERATION:  1. Removal of right ankle and foot spanning external fixator  2. Right 2nd tarsometatarsal joint dislocation open reduction with internal fixation  3. Right tarsal joint dislocation involving inner cuneiform instability open reduction with internal fixation   4. Open treatment 3rd tarsometatarsal joint fracture dislocations    5. Closed treatment 4th tarsometatarsal joint fracture dislocations  6. Closed treatment 5th metatarsal fracture     Subjective:  Mk Thayer is following up from the above surgery. He is WBAT on right lower extremity. He ambulates with no assistive device. Pain to extremity is mild and is not taking prescribed pain medication. They denies numbness or tingling to the right lower extremity. Denies calf pain, chest pain, or shortness of breath. Patient has finished DVT prophylaxis. Patient is not participating in therapy at this time. Overall he is doing well. He does have some occasional soreness in his foot after standing or walking for extended periods. 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 States he does have to do quite a bit of stairs at times at work this does seem to aggravate his foot. He works as a . He has been using hard soled work boots with good ankle support, states this helps some. Is wondering if he should retrial inserts for his shoes as he did this previously but that was closer to after his initial injury.   Overall feels there is been no real significant change with regards to his aches and pains about his foot, it is worse with prolonged activities and with hyper movement through the midfoot. It improves with rest and soaks. Uses occasional anti-inflammatories. Review of Systems -  All pertinent positives/negative in HPI     Objective:    General: Alert and oriented X 3, normocephalic atraumatic, external ears and eye normal, sclera clear, no acute distress, respirations easy and unlabored with no audible wheezes, skin warm and dry, speech and dress appropriate for noted age, affect euthymic. Extremity:  Right Lower Extremity  Skin clean dry and intact, without signs of infection   Incision well healed  no edema noted, mild nonspecific tenderness about the medial midfoot  Compartments supple throughout thigh and leg  Calf supple and not tender  negative Homans  Demonstrates active motion with ankle dorsi/plantar flexion. Wiggles all of his toes. Ambulates with no assistive devices. States sensation intact to touch in sural, deep peroneal, superficial peroneal, saphenous, posterior tibial  nerve distributions to foot/ankle. Palpable dorsalis pedis and posterior tibialis pulses, cap refill brisk in toes, foot warm/perfused. There were no vitals taken for this visit. XR:   3 views right foot demonstrate no acute osseous abnormalities, continued appearance of osteopenia, no fracture or dislocation. Assessment:   Diagnosis Orders   1. Closed displaced fracture of metatarsal bone of right foot with routine healing, unspecified metatarsal, subsequent encounter        2. Multiple closed fractures of tarsal bone of right foot with routine healing, subsequent encounter        3. Dislocation of tarsometatarsal joint of left foot, subsequent encounter        4. Dislocation of tarsometatarsal joint of right foot, subsequent encounter        5. S/P hardware removal          Plan:  X-rays reviewed and discussed. Continue weightbearing as tolerated right lower extremity.   Continue with supportive shoe wear, discussed retrying inserts for arch support which he is agreeable to  Also discussed more advanced treatment options moving forward such as injections or fusions which he is not interested in at this time. Can follow-up as needed  Call if any questions or concerns. Electronically signed by Susan Frank DO on 8/11/2022     Note: This report was completed using [employer] voiced recognition software. Every effort has been made to ensure accuracy; however, inadvertent computerized transcription errors may be present.

## 2023-02-14 ENCOUNTER — TELEPHONE (OUTPATIENT)
Dept: ORTHOPEDIC SURGERY | Age: 43
End: 2023-02-14

## 2023-02-14 NOTE — TELEPHONE ENCOUNTER
Received call from patient requesting new [employer]-14. Patient inquiring if another office visit is necessary. Per last OV on 8/11/2022 patient was to follow up PRN however patient is still on work restrictions. Routed to providers for recommendations. No future appointments.

## 2023-02-15 NOTE — TELEPHONE ENCOUNTER
Call placed to patient at this time. Appointment scheduled.      Future Appointments   Date Time Provider Joby Valdez   2/23/2023  2:30 PM [facility]

## 2023-02-21 DIAGNOSIS — R52 PAIN: Primary | ICD-10-CM

## 2023-02-23 ENCOUNTER — HOSPITAL ENCOUNTER (OUTPATIENT)
Dept: GENERAL RADIOLOGY | Age: 43
Discharge: HOME | End: 2023-02-25
Payer: COMMERCIAL

## 2023-02-23 ENCOUNTER — OFFICE VISIT (OUTPATIENT)
Dept: ORTHOPEDIC SURGERY | Age: 43
End: 2023-02-23
Payer: COMMERCIAL

## 2023-02-23 DIAGNOSIS — R52 PAIN: ICD-10-CM

## 2023-02-23 DIAGNOSIS — S92.201D: ICD-10-CM

## 2023-02-23 DIAGNOSIS — S93.325D DISLOCATION OF TARSOMETATARSAL JOINT OF LEFT FOOT, SUBSEQUENT ENCOUNTER: ICD-10-CM

## 2023-02-23 DIAGNOSIS — S92.301D CLOSED DISPLACED FRACTURE OF METATARSAL BONE OF RIGHT FOOT WITH ROUTINE HEALING, UNSPECIFIED METATARSAL, SUBSEQUENT ENCOUNTER: Primary | ICD-10-CM

## 2023-02-23 DIAGNOSIS — Z98.890 S/P HARDWARE REMOVAL: ICD-10-CM

## 2023-02-23 PROCEDURE — 73630 X-RAY EXAM OF FOOT: CPT

## 2023-02-23 PROCEDURE — 99212 OFFICE O/P EST SF 10 MIN: CPT

## 2023-02-23 RX ORDER — VARENICLINE TARTRATE 0.5 MG/1
TABLET, FILM COATED ORAL
COMMUNITY
Start: 2023-01-30 | End: 2023-02-23

## 2023-02-23 RX ORDER — VARENICLINE TARTRATE 1 MG/1
TABLET, FILM COATED ORAL
COMMUNITY
Start: 2023-01-30

## 2023-02-23 RX ORDER — PREGABALIN 25 MG/1
CAPSULE ORAL
COMMUNITY
Start: 2023-01-30

## 2023-02-23 NOTE — PATIENT INSTRUCTIONS
Weightbearing as tolerated right lower extremity. Recommend for patient to remain on current work restrictions permanently. Recommend C9 authorization for custom bilateral shoe inserts from [employer]. Follow-up in 1 year for reevaluation and x-rays. Call if any questions or concerns.

## 2023-02-23 NOTE — PROGRESS NOTES
Chief Complaint   Patient presents with    Follow-up     [facility] follow up: Right foot/ankle, SX 2016 and 2017      SURGEON:  Shira Soria DO  DATE OF PROCEDURE:  11/17/2016  OPERATION:  1. Removal of right ankle and foot spanning external fixator  2. Right 2nd tarsometatarsal joint dislocation open reduction with internal fixation  3. Right tarsal joint dislocation involving inner cuneiform instability open reduction with internal fixation   4. Open treatment 3rd tarsometatarsal joint fracture dislocations    5. Closed treatment 4th tarsometatarsal joint fracture dislocations  6. Closed treatment 5th metatarsal fracture     OP:SURGEON: Dr. Honor Cheadle, DO  DATE OF PROCEDURE: 11-29-17  PROCEDURE: Right midfoot deep hardware removal.    Subjective:  Hannah  is following up from the above surgery. He is WBAT on right lower extremity. He ambulates with no assistive device. Pain to extremity is mild and is not taking prescribed pain medication. They denies numbness or tingling to the right lower extremity. Denies calf pain, chest pain, or shortness of breath. Patient has finished DVT prophylaxis. Patient is not participating in therapy at this time. He is doing well. He is a  and has been back working with restrictions consisting of no ladder climbing and taking frequent breaks. He states that ladder climbing puts too much pressure on his midfoot area. Review of Systems -  All pertinent positives/negative in HPI     Objective:    General: Alert and oriented X 3, normocephalic atraumatic, external ears and eye normal, sclera clear, no acute distress, respirations easy and unlabored with no audible wheezes, skin warm and dry, speech and dress appropriate for noted age, affect euthymic.     Extremity:  Right Lower Extremity  Skin clean dry and intact, without signs of infection   Incision well-healed  no edema noted  Compartments supple throughout thigh and leg  Calf supple and not tender  negative Homans  Demonstrates active motion with ankle dorsi/plantarflexion, wiggles toes  Ambulates with no assistive devices, no limp  States sensation intact to touch in sural, deep peroneal, superficial peroneal, saphenous, posterior tibial  nerve distributions to foot/ankle. Palpable dorsalis pedis and posterior tibialis pulses, cap refill brisk in toes, foot warm/perfused. No TTP over the ankle or midfoot    There were no vitals taken for this visit. XR:   3 views right foot demonstrate no acute osseous abnormalities, continued appearance of osteopenia, no fracture or dislocation. Assessment:   Diagnosis Orders   1. Closed displaced fracture of metatarsal bone of right foot with routine healing, unspecified metatarsal, subsequent encounter        2. Multiple closed fractures of tarsal bone of right foot with routine healing, subsequent encounter        3. Dislocation of tarsometatarsal joint of left foot, subsequent encounter        4. S/P hardware removal          Plan:  X-rays reviewed and discussed. Weightbearing as tolerated right lower extremity. Recommend for patient to remain on current work restrictions permanently. Recommend C9 authorization for custom bilateral shoe inserts from [facility] [facility]. Follow-up in 1 year for reevaluation and x-rays. Call if any questions or concerns. [facility] Patient Plan Of Care  New Orders Needing C-9 Authorization: Referral to [facility] for bilateral custom shoe inserts  [employer]/Work Status: Recommend for patient to continue with current work restrictions on a permanent basis. No climbing ladders, take breaks throughout the day.   Patient Progressing: Progressing as expected  Patient candidate for Vocational Rehab at this time: No  Patient determined to be MMI at this visit: No     Electronically signed by NICK Huizar on 2/23/2023 at 2:57 PM  Note: This report was completed using computerize voiced recognition software. Every effort has been made to ensure accuracy; however, inadvertent computerized transcription errors may be present.